# Patient Record
Sex: MALE | Race: WHITE | URBAN - METROPOLITAN AREA
[De-identification: names, ages, dates, MRNs, and addresses within clinical notes are randomized per-mention and may not be internally consistent; named-entity substitution may affect disease eponyms.]

---

## 2018-09-17 ENCOUNTER — OFFICE VISIT (OUTPATIENT)
Dept: FAMILY MEDICINE | Facility: CLINIC | Age: 53
End: 2018-09-17
Payer: COMMERCIAL

## 2018-09-17 VITALS — SYSTOLIC BLOOD PRESSURE: 123 MMHG | TEMPERATURE: 98.4 F | DIASTOLIC BLOOD PRESSURE: 76 MMHG

## 2018-09-17 DIAGNOSIS — Z23 NEED FOR VACCINATION: ICD-10-CM

## 2018-09-17 DIAGNOSIS — Z71.84 TRAVEL ADVICE ENCOUNTER: Primary | ICD-10-CM

## 2018-09-17 PROCEDURE — 90632 HEPA VACCINE ADULT IM: CPT | Mod: GA | Performed by: NURSE PRACTITIONER

## 2018-09-17 PROCEDURE — 99402 PREV MED CNSL INDIV APPRX 30: CPT | Mod: 25 | Performed by: NURSE PRACTITIONER

## 2018-09-17 PROCEDURE — 90682 RIV4 VACC RECOMBINANT DNA IM: CPT | Mod: GA | Performed by: NURSE PRACTITIONER

## 2018-09-17 PROCEDURE — 90472 IMMUNIZATION ADMIN EACH ADD: CPT | Mod: GA | Performed by: NURSE PRACTITIONER

## 2018-09-17 PROCEDURE — 90471 IMMUNIZATION ADMIN: CPT | Mod: GA | Performed by: NURSE PRACTITIONER

## 2018-09-17 PROCEDURE — 90691 TYPHOID VACCINE IM: CPT | Mod: GA | Performed by: NURSE PRACTITIONER

## 2018-09-17 PROCEDURE — 90717 YELLOW FEVER VACCINE SUBQ: CPT | Mod: GA | Performed by: NURSE PRACTITIONER

## 2018-09-17 RX ORDER — FLUTICASONE PROPIONATE 50 MCG
SPRAY, SUSPENSION (ML) NASAL
COMMUNITY
Start: 2017-09-22

## 2018-09-17 RX ORDER — LEVOTHYROXINE SODIUM 150 UG/1
150 TABLET ORAL
COMMUNITY
Start: 2017-07-10

## 2018-09-17 RX ORDER — AZITHROMYCIN 500 MG/1
500 TABLET, FILM COATED ORAL DAILY
Qty: 3 TABLET | Refills: 0 | Status: SHIPPED | OUTPATIENT
Start: 2018-09-17 | End: 2018-09-20

## 2018-09-17 NOTE — PATIENT INSTRUCTIONS
Today September 17, 2018 you received the    Flu Vaccine    Hepatitis A Vaccine - Please return on 3/16/19 or later for your 2nd and final dose.    Yellow Fever (YF)    Typhoid - injectable. This vaccine is valid for two years.   .    These appointments can be made as a NURSE ONLY visit.    **It is very important for the vaccinations to be given on the scheduled day(s), this helps ensure you receive the full effectiveness of the vaccine.**    Please call [facility] with any questions [phone]    Thank you for visiting [facility]

## 2018-09-17 NOTE — PROGRESS NOTES
Nurse Note      Itinerary:  \[facility]\""       Departure Date: 09/22/2018       Return Date: 10/19/2018      Length of Trip 1 month      Reason for Travel: Visiting friends and relatives           Urban or rural: Urban      Accommodations: Hotel        IMMUNIZATION HISTORY  Have you received any immunizations within the past 4 weeks?  No  Have you ever fainted from having your blood drawn or from an injection?  No  Have you ever had a fever reaction to vaccination?  No  Have you ever had any bad reaction or side effect from any vaccination?  No  Have you ever had hepatitis A or B vaccine?  No  Do you live (or work closely) with anyone who has AIDS, an AIDS-like condition, any other immune disorder or who is on chemotherapy for cancer?  No  Do you have a family history of immunodeficiency?  No  Have you received any injection of immune globulin or any blood products during the past 12 months?  No    Patient roomed by CLAIRE Garcia  Gaudencio Bryant is a 53 year old male seen today alone for counsultation for international travel to \[facility]\"" for Visiting friends and relatives.  Patient will be departing in  5 day(s) and staying for   1 month(s) and  traveling with wife and son.      Patient itinerary :  will be in the [facility] and [facility] region of \[facility]\"" which presents risk for Yellow Fever, Dengue Fever, Chikungungya, Schistosomiasis, food borne illnesses, motor vehicle accidents, Typhoid, Leishmaniasis and Lassa Fever. exposure.      Patient's activities will include visiting friends and relatives.    Patient's country of birth is \[facility]\"", arrival to  1987    Special medical concerns: none  Pre-travel questionnaire was completed by patient and reviewed by provider.     Vitals: /76  Temp 98.4  F (36.9  C) (Oral)  BMI= There is no height or weight on file to calculate BMI.    EXAM:  General:  Well-nourished, well-developed in no acute distress.  Appears to be stated age, interacts  appropriately and expresses understanding of information given to patient.    Current Outpatient Prescriptions   Medication Sig Dispense Refill     fluticasone (FLONASE) 50 MCG/ACT spray        levothyroxine (SYNTHROID/LEVOTHROID) 150 MCG tablet Take 150 mcg by mouth       There is no problem list on file for this patient.    No Known Allergies      Immunizations discussed include:   Hepatitis A:  Ordered/given today, risks, benefits and side effects reviewed  Hepatitis B: Up to date per report  Influenza: Ordered/given today, risks, benefits and side effects reviewed (flublok)  Typhoid: Ordered/given today, risks, benefits and side effects reviewed  Rabies: Insufficient time to vaccinate  Yellow Fever: Stamaril Ordered/given today - consent completed, side effects, precautions, allergies, risks discussed. Patient expressed understanding.  Kyrgyz Encephalitis: Not indicated  Meningococcus: Declined  Not concerned about risk of disease  Tetanus/Diphtheria: Up to date  Measles/Mumps/Rubella: Up to date per reprot  Cholera: Not needed  Polio: Up to date  Pneumococcal: Under age of 65  Varicella: Immune by disease history per patient report  Zostavax:  Not indicated  Shingrix: Not indicated  HPV:  Not indicated  TB:  Low risk     Stamaril Informed Consent    The patient was provided with a copy of the IRB-approved consent form and all questions were answered before the patient agreed to participate by signing the informed consent document.   A copy of the form was provided to the patient.    Date: September 17, 2018   Consent Version Date: 05/10/2007  Consent Obtained by:  Chen Gore CNP (Lori)     HIPAA:  Yes  HIPAA Authorization Signed Date: September 17, 2018       Inclusion/Exclusion Criteria:    (Similar to Yellow Fever-VAX)      The patient met all of the following inclusion criteria in order to be eligible for the Stamaril vaccination under this EAP (Expanded Access Investigational New Drug Program)            At increased risk for YF, including researchers, laboratory workers, vaccine production staff, and those who are traveling within 30 days to a YF-endemic region or to a country requiring proof of YF vaccination under IHRs (International Health Regulations)?       Yes     Patient is greater than or equal to 9 months of age on the day of vaccination?     Yes     Patient is greater than or equal to 18 years of age and signed and dated the Consent Forms?     Yes     Patient is < 18 years of age and parent(s)/guardian(s) signed and dated the Consent Forms?      Patient is 7 years to < 18 years of age and signed and dated the Assent form?        No Assent is required.  Patient is <7 years of age.     No      No      N/A     The patient did not meet any of the following criteria that would have excluded the patient from receiving the Stamaril vaccination under this EAP              Patient is less than 9 months of age.       No     The patient is breast-feeding and cannot stop nursing for at least 14 days after vaccination.    Note: Yellow Fever vaccine virus may be transmissible via breast milk by nursing mothers who are vaccinated during the final 2 weeks of pregnancy or post-partum.   Following transmission, infants may develop encephalitis.  The minimum time of discontinuation of breastfeeding for 14 days after vaccination is based on the expected clearance of live-attenuated vaccine virus.       No     The patient is immunosuppressed, whether congenital or idiopathic, including for example, leukemia, lymphoma, other malignancies, and patients who are receiving immunosuppressant medications (e.g. Systemic corticosteroids [greater than the standard dose of topical or inhaled steroids], alkylating drugs, antimetabolites, of other cytotoxic or immunomodulatory drugs) or radiation therapy or organ transplantation.       No     The patient has known hypersensitivity to the active substance or to any of the excipients of  Stamaril vaccine or to eggs or chicken proteins.     No     The patient is symptomatic for human immunodeficiency virus (HIV) infection     No     The patient is asymptomatic for HIV infection but accompanied by evidence of severe immune suppression    Note:  Evidence of severe immune suppression includes CD4+ T-cell counts < 200 cubic millimeters (or < 15% total lymphocytes in children aged < 6 years), or as determined by the health care provider.       No     The patient has a history of thymus dysfunction (including myasthenia gravis, thymoma, thymectomy)     No     Moderate or severe febrile illness or acute illness    Note: Participation in the EAP can be reassessed when moderate or severe febrile illness or acute illness has resolved.       No         Altitude Exposure on this trip: [place]  Past tolerance to Altitude: tolerates    ASSESSMENT/PLAN:    ICD-10-CM    1. Travel advice encounter Z71.89 C YELLOW FEVER IMMUNIZATION, LIVE, SQ (STAMARIL)     HEPA VACCINE ADULT IM     TYPHOID VACCINE, IM     azithromycin (ZITHROMAX) 500 MG tablet   2. Need for vaccination Z23 C YELLOW FEVER IMMUNIZATION, LIVE, SQ (STAMARIL)     HEPA VACCINE ADULT IM     TYPHOID VACCINE, IM     C RIV4 (FLUBLOK) VACCINE RECOMBINANT DNA PRSRV ANTIBIO FREE, IM     I have reviewed general recommendations for safe travel   including: food/water precautions, insect precautions, safer sex   practices given high prevalence of Zika, HIV and other STDs,   roadway safety. Educational materials and [employer] report provided.    Malaraia prophylaxis recommended: declines, will be in higher altitude  Symptomatic treatment for traveler's diarrhea: azithromycin  Altitude illness prevention and treatment: none      Evacuation insurance advised and resources were provided to patient.    Total visit time 30 minutes  with over 50% of time spent counseling patient as detailed above.    Chen Gore CNP

## 2018-09-17 NOTE — MR AVS SNAPSHOT
After Visit Summary   9/17/2018    Gaudencio Bryant    MRN: [id number]           Patient Information     Date Of Birth          1965        Visit Information        Provider Department      9/17/2018 4:15 PM Chen Gore APRN CNP [facility]        Today's Diagnoses     Travel advice encounter    -  1    Need for vaccination          Care Instructions    Today September 17, 2018 you received the    Flu Vaccine    Hepatitis A Vaccine - Please return on 3/16/19 or later for your 2nd and final dose.    Yellow Fever (YF)    Typhoid - injectable. This vaccine is valid for two years.   .    These appointments can be made as a NURSE ONLY visit.    **It is very important for the vaccinations to be given on the scheduled day(s), this helps ensure you receive the full effectiveness of the vaccine.**    Please call [facility] with any questions [phone]    Thank you for visiting [facility]                Follow-ups after your visit        Who to contact     If you have questions or need follow up information about today's clinic visit or your schedule please contact [facility] directly at [phone].  Normal or non-critical lab and imaging results will be communicated to you by MyChart, letter or phone within 4 business days after the clinic has received the results. If you do not hear from us within 7 days, please contact the clinic through MyChart or phone. If you have a critical or abnormal lab result, we will notify you by phone as soon as possible.  Submit refill requests through [employer] or call your pharmacy and they will forward the refill request to us. Please allow 3 business days for your refill to be completed.          Additional Information About Your Visit        Care EveryWhere ID     This is your Care EveryWhere ID. This could be used by other organizations to access your [facility] medical records  [id number]        Your  Vitals Were     Temperature                   98.4  F (36.9  C) (Oral)            Blood Pressure from Last 3 Encounters:   09/17/18 123/76    Weight from Last 3 Encounters:   No data found for Wt              We Performed the Following     C RIV4 (FLUBLOK) VACCINE RECOMBINANT DNA PRSRV ANTIBIO FREE, IM     C YELLOW FEVER IMMUNIZATION, LIVE, SQ (STAMARIL)     HEPA VACCINE ADULT IM     TYPHOID VACCINE, IM          Today's Medication Changes          These changes are accurate as of 9/17/18  4:43 PM.  If you have any questions, ask your nurse or doctor.               Start taking these medicines.        Dose/Directions    azithromycin 500 MG tablet   Commonly known as:  ZITHROMAX   Used for:  Travel advice encounter        Dose:  500 mg   Take 1 tablet (500 mg) by mouth daily for 3 doses Take 1 tablet a day for up to 3 days for severe diarrhea   Quantity:  3 tablet   Refills:  0            Where to get your medicines      These medications were sent to [facility] [address] [place] [address] [place], [place] - [address] AT [place] [address], [place] [address]     Phone:  [phone]     azithromycin 500 MG tablet                Primary Care Provider Office Phone # Fax #    [facility] [phone] [id number]  [facility] [address]        Equal Access to Services     THALIA KAPLAN AH: Hadii aad ku hadasho Soomaali, waaxda luqadaha, qaybta kaalmada adeegyada, waxay idiin hayaan carmen sunshine . So [facility] [phone].    ATENCIÓN: Si habla español, tiene a randolph disposición servicios gratuitos de asistencia lingüística. Llame al [phone].    We comply with applicable federal civil rights laws and [place] laws. We do not discriminate on the basis of race, color, national origin, age, disability, sex, sexual orientation, or gender identity.            Thank you!     Thank you for choosing [facility]  for your care. Our goal is always to  provide you with excellent care. Hearing back from our patients is one way we can continue to improve our services. Please take a few minutes to complete the written survey that you may receive in the mail after your visit with us. Thank you!             Your Updated Medication List - Protect others around you: Learn how to safely use, store and throw away your medicines at www.disposemymeds.org.          This list is accurate as of 9/17/18  4:43 PM.  Always use your most recent med list.                   Brand Name Dispense Instructions for use Diagnosis    azithromycin 500 MG tablet    ZITHROMAX    3 tablet    Take 1 tablet (500 mg) by mouth daily for 3 doses Take 1 tablet a day for up to 3 days for severe diarrhea    Travel advice encounter       fluticasone 50 MCG/ACT spray    FLONASE          levothyroxine 150 MCG tablet    SYNTHROID/LEVOTHROID     Take 150 mcg by mouth